# Patient Record
Sex: FEMALE | Race: BLACK OR AFRICAN AMERICAN | NOT HISPANIC OR LATINO
[De-identification: names, ages, dates, MRNs, and addresses within clinical notes are randomized per-mention and may not be internally consistent; named-entity substitution may affect disease eponyms.]

---
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
## 2022-10-20 ENCOUNTER — APPOINTMENT (OUTPATIENT)
Dept: PEDIATRIC ORTHOPEDIC SURGERY | Facility: CLINIC | Age: 22
End: 2022-10-20

## 2022-10-20 PROCEDURE — 99214 OFFICE O/P EST MOD 30 MIN: CPT | Mod: 25

## 2022-10-20 PROCEDURE — 72082 X-RAY EXAM ENTIRE SPI 2/3 VW: CPT

## 2022-10-20 NOTE — DATA REVIEWED
[de-identified] : 2 views of the entire spine today reveal hardware in place. no evidence of breakage. Good overall alignment. \par

## 2022-10-20 NOTE — ASSESSMENT
[FreeTextEntry1] : Back pain without radiation\par Hx PSF in 2015.\par \par Xrays today of the entire spine reveal hardware in place. No evidence of breakage. Good overall alignment. \par Her pain appears muscular in nature in the lumbar region. A course of PT is recommended to work on strengthening, core/back conditioning. If there is no improvement after 8-12 weeks of PT, she will f/u for reevaluation.\par All questions answered\par \par ISara, MPAS, PAC have acted as scribe and documented the above for Dr. Lopes. \par The above documentation completed by the scribe is an accurate record of both my words and actions.  JPD\par \par

## 2022-10-20 NOTE — HISTORY OF PRESENT ILLNESS
[Stable] : stable [FreeTextEntry1] : 22-year-old female presents for evaluation of back pain.  The patient has history of posterior spinal fusion in 2015.  She states she has had pain intermittently for years but the pain increased in severity over the past few months.  She states she started working at  [employer] and has pain when she stands for long period of time.  She also has some pain with walking and lifting.  She is sleeping well at night.  She is not requiring any pain medication.  She denies any radiation of pain.  She denies any numbness or tingling.  She denies any bowel or bladder incontinence.  She has not undergone any physical therapy for many years.

## 2022-10-20 NOTE — PHYSICAL EXAM
[FreeTextEntry1] : GENERAL: alert, cooperative pleasant overweight young woman in NAD\par SKIN: The skin is intact, warm, pink and dry over the area examined.\par EYES: Normal conjunctiva, normal eyelids and pupils were equal and round.\par ENT: normal ears, mask obscures exam\par CARDIOVASCULAR: brisk capillary refill, but no peripheral edema.\par RESPIRATORY: The patient is in no apparent respiratory distress. They're taking full deep breaths without use of accessory muscles or evidence of audible wheezes or stridor without the use of a stethoscope. Normal respiratory effort.\par ABDOMEN: not examined\par NEUROLOGICAL:  5/5 motor strength in the main muscle groups of bilateral lower extremities, sensory intact in bilateral lower extremities, 2+/symmetrical deep tendon reflexes were present in bilateral knees and bilateral Achilles, abdominal deep tendon reflexes are symmetrical in all 4 quadrants. \par Negative Babinski\par No clonus\par Gait without evidence of antalgia.\par Able to walk heels and toes without difficulty\par Visualized getting on and off the exam table with good coordination and balance.\par SPINE: midline incision well healed. good overall alignment. Able to touch fingertips to floor. No pain with hyperextension or twist\par Neg SLR\par Neg patricio\par PA 10 degrees bilaterally.

## 2022-10-20 NOTE — REVIEW OF SYSTEMS
[Menarche] : ~T menarche [Back Pain] : ~T back pain [Change in Activity] : no change in activity [Fever Above 102] : no fever [Rash] : no rash [Heart Problems] : no heart problems [Congestion] : no congestion [Feeding Problem] : no feeding problem Home

## 2024-01-25 ENCOUNTER — APPOINTMENT (OUTPATIENT)
Dept: PEDIATRIC ORTHOPEDIC SURGERY | Facility: CLINIC | Age: 24
End: 2024-01-25
Payer: MEDICAID

## 2024-01-25 DIAGNOSIS — M54.9 DORSALGIA, UNSPECIFIED: ICD-10-CM

## 2024-01-25 DIAGNOSIS — M41.124 ADOLESCENT IDIOPATHIC SCOLIOSIS, THORACIC REGION: ICD-10-CM

## 2024-01-25 PROCEDURE — 99213 OFFICE O/P EST LOW 20 MIN: CPT

## 2024-01-25 NOTE — HISTORY OF PRESENT ILLNESS
[FreeTextEntry1] : 23-year-old female presents for f/u of back pain.  The patient has history of posterior spinal fusion in 2015.  She states she has had pain intermittently for years. She is in college and there are essential classes she needs to take for degree in the area of physical education but she is having increased pain in the back with activity. She also c/o LBP when she works and has to lift  objects or standing for long periods. SHe has been unable to formally attend PT due to time constraints since last visit.   She is sleeping well at night.  She is not requiring any pain medication.  She denies any radiation of pain.  She denies any numbness or tingling.  She denies any bowel or bladder incontinence.  She is requesting a letter for her school to be exempt from the PE portion.

## 2024-01-25 NOTE — ASSESSMENT
[FreeTextEntry1] : S/p PSF in 2015 with occasional LBP  Her exam and symptoms are unchanged. She is doing well. Note was provided for her school to exempt her from the mandatory PE credits needed to graduate as activity can sometimes exacerbate her pain.  PT can be tried if the pain becomes more intense. We will continue to follow her spine with xrays every 5 years to monitor hardware. SHe will return in 3 years for xrays of the spine All questions answered. Parent in agreement with the plan. I, Sara Muñoz, MPAS, PAC, have acted as a scribe and documented the above for Dr. Martinez.  The above documentation completed by the scribe is an accurate record of both my words and actions.  DANA

## 2024-01-25 NOTE — REVIEW OF SYSTEMS
[Change in Activity] : no change in activity [Rash] : no rash [Fever Above 102] : no fever [Congestion] : no congestion [Heart Problems] : no heart problems [Feeding Problem] : no feeding problem

## 2024-01-25 NOTE — PHYSICAL EXAM
[FreeTextEntry1] : GENERAL: alert, cooperative pleasant overweight young woman in NAD SKIN: The skin is intact, warm, pink and dry over the area examined. EYES: Normal conjunctiva, normal eyelids and pupils were equal and round. ENT: normal ears, mask obscures exam CARDIOVASCULAR: brisk capillary refill, but no peripheral edema. RESPIRATORY: The patient is in no apparent respiratory distress. They're taking full deep breaths without use of accessory muscles or evidence of audible wheezes or stridor without the use of a stethoscope. Normal respiratory effort. ABDOMEN: not examined NEUROLOGICAL:  5/5 motor strength in the main muscle groups of bilateral lower extremities, sensory intact in bilateral lower extremities, 2+/symmetrical deep tendon reflexes were present in bilateral knees and bilateral Achilles, abdominal deep tendon reflexes are symmetrical in all 4 quadrants.  Negative Babinski No clonus Gait without evidence of antalgia. Able to walk heels and toes without difficulty Visualized getting on and off the exam table with good coordination and balance. SPINE: midline incision well healed. good overall alignment. Able to touch fingertips to floor. No pain with hyperextension or twist Neg SLR Neg patricio PA 10 degrees bilaterally.